# Patient Record
(demographics unavailable — no encounter records)

---

## 2025-05-11 NOTE — HISTORY OF PRESENT ILLNESS
[FreeTextEntry1] : Delmy is a well appearing almost two year old who was referred previously for a heart murmur and found to have a patent foramen ovale and bilateral peripheral pulmonic stenosis as well as a prominent eustachian valve versus a non-obstructive chiari network in the right atrium.   She presents today for a follow up assessment and remains asymptomatic. She is accompanied by her parent who provided information.   Delmy presents doing well.  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 Followed by ENT with [plans for BMT and adenoidectomy.   There has been no unexplained crying or irritability to suggest chest pain or palpitations.  There has been no cyanosis, shortness of breath, dizziness, lightheadedness, syncope or near syncope.  No reported history of feeding difficulties. BF on demand with formula supplementation.   No associated increased work of breathing, prolonged feeding duration, diaphoresis or early fatigue. Active and playful. No excessive fatigue.  Good appetite, remaining well hydrated. Normal urine output. No reported concerns with growth or development.  There has been no recent fevers, illnesses or hospitalizations. Eczema and uses flonase. No recent albuterol use.    Birth Hx Full-term; c/s secondary to nuchal cord, maternal fever. By parental report retained fetal fluid; CPAP with oxygen requirement till ~ 3 days old. No CLD or pulmonary hypertension.    No family history of an arrhythmia, aortic aneurysm, unexplained death, bicuspid aortic valve,  congenital heart disease, cardiomyopathy or sudden cardiac death, long QT syndrome, drowning or unexplained accidental death.

## 2025-05-11 NOTE — PAST MEDICAL HISTORY
[At Term] : at term [Birth Weight:___] : [unfilled] weighed [unfilled] at birth. [ Section] : by  section [Non-reassuring Fetal Status] : non-reassuring fetal status [None] : No maternal complications [de-identified] : Cord around neck [de-identified] : Fever and virus [FreeTextEntry1] : Fluid in lungs

## 2025-05-11 NOTE — CONSULT LETTER
[Today's Date] : [unfilled] [Name] : Name: [unfilled] [] : : ~~ [Today's Date:] : [unfilled] [Dear  ___:] : Dear Dr. [unfilled]: [Consult] : I had the pleasure of evaluating your patient, [unfilled]. My full evaluation follows. [Consult - Single Provider] : Thank you very much for allowing me to participate in the care of this patient. If you have any questions, please do not hesitate to contact me. [Sincerely,] : Sincerely, [FreeTextEntry4] : Roland Mccain MD [FreeTextEntry6] : [place] [address] [FreeTextEntry5] : [address]  [de-identified] : Alton Fernández DO MPH\par  Pediatric Cardiology\par  [facility]\par   .

## 2025-05-11 NOTE — CARDIOLOGY SUMMARY
[LVSF ___%] : LV Shortening Fraction [unfilled]% [de-identified] : 5/9/25 [FreeTextEntry1] : Normal sinus rhythm @ 104 bpm OK: 98 ms QRS: 64 ms  QTc: 402 ms P-R-T Axis (37-43-49) Normal voltage and intervals No ST segment abnormalities No pre-excitation  [de-identified] : 5/9/25 [FreeTextEntry2] : A complete 2D, M-mode, doppler and color flow doppler transthoracic pediatric echocardiogram was performed. The intracardiac anatomy and doppler flow profiles were otherwise normal appearing with the following:   Summary: 1. No evidence of pulmonary hypertension. 2. Prominent eustachian valve versus a non-obstructive chiari network in the right atrium. 3. Patent foramen ovale with left to right shunt, normal variant. 4. Trivial tricuspid valve regurgitation. 5. Normal right ventricular morphology with qualitatively normal size and systolic function. 6. Normal left ventricular size, morphology and systolic function. 7. No pericardial effusion.

## 2025-05-11 NOTE — REVIEW OF SYSTEMS
[Nasal Discharge] : nasal discharge [Nasal Stuffiness] : nasal congestion [Cough] : cough [Acting Fussy] : not acting ~L fussy [Fever] : no fever [Wgt Loss (___ Lbs)] : no recent weight loss [Pallor] : not pale [Eye Discharge] : no eye discharge [Redness] : no redness [Sore Throat] : no sore throat [Earache] : no earache [Cyanosis] : no cyanosis [Edema] : no edema [Diaphoresis] : not diaphoretic [Chest Pain] : no chest pain or discomfort [Exercise Intolerance] : no persistence of exercise intolerance [Fast HR] : no tachycardia [Tachypnea] : not tachypneic [Wheezing] : no wheezing [Being A Poor Eater] : not a poor eater [Vomiting] : no vomiting [Diarrhea] : no diarrhea [Decrease In Appetite] : appetite not decreased [Abdominal Pain] : no abdominal pain [Fainting (Syncope)] : no fainting [Seizure] : no seizures [Hypotonicity (Flaccid)] : not hypotonic [Limping] : no limping [Joint Pains] : no arthralgias [Joint Swelling] : no joint swelling [Rash] : no rash [Wound problems] : no wound problems [Bruising] : no tendency for easy bruising [Nosebleeds] : no epistaxis [Swollen Glands] : no lymphadenopathy [Sleep Disturbances] : ~T no sleep disturbances [Hyperactive] : no hyperactive behavior [Failure To Thrive] : no failure to thrive [Short Stature] : short stature was not noted [Dec Urine Output] : no oliguria [FreeTextEntry1] : eczema rash

## 2025-05-11 NOTE — DISCUSSION/SUMMARY
[May participate in all age-appropriate activities] : [unfilled] May participate in all age-appropriate activities. [FreeTextEntry1] : RAUL  is a healthy, thriving 23 month old who presents without identified concerns for congestive heart failure nor impaired cardiac output by her  past medical history nor on her  current physical exam. her  EKG and echocardiogram were further reassuring.   -Ongoing trivial tricuspid regurgitation in otherwise well functioning valves. This was not audible on physical exam and not hemodynamically significant at this time.   -There remains a patent foramen ovale with left to right flow. There remains likely a prominent eustachian valve versus a non-obstructive chiari network. Normal resting EKG.  These are persistent structures of fetal circulation specifically remnants of the right valve of sinus venosus; with some studies demonstrating incidental findings among 1-4% of the population. Often with a PFO. They tend to be benign without identified correlating clinical symptoms at this time.  -No ongoing significant PPS.   - I explained to her  parent that the murmur on exam is consistent with an innocent flow murmur and not likely related to any significant cardiac pathology. her  echocardiogram was further reassuring without identified murmur causing cardiac lesions. These murmurs may even be heard louder during times of fever or illness.   -Normal biventricular size and systolic function. Pulmonary artery pressure does not appear significantly elevated.   -No identified cardiac contraindications to her upcoming ENT surgery at this time.   Reviewed symptoms including feeding intolerance, cyanosis or changes in work of breathing that should prompt medical evaluation and sooner follow up.    I recommended 12 month follow up and we reviewed signs and symptoms that should prompt medical attention and sooner evaluation. Above information explained in detail with assistance of a colored diagram.  RAUL's family verbalized their understanding and all questions were answered.    [Needs SBE Prophylaxis] : [unfilled] does not need bacterial endocarditis prophylaxis

## 2025-05-11 NOTE — PHYSICAL EXAM
[General Appearance - Alert] : alert [General Appearance - In No Acute Distress] : in no acute distress [General Appearance - Well Nourished] : well nourished [General Appearance - Well Developed] : well developed [General Appearance - Well-Appearing] : well appearing [Appearance Of Head] : the head was normocephalic [Facies] : there were no dysmorphic facial features [Sclera] : the conjunctiva were normal [Outer Ear] : the ears and nose were normal in appearance [Examination Of The Oral Cavity] : mucous membranes were moist and pink [Auscultation Breath Sounds / Voice Sounds] : breath sounds clear to auscultation bilaterally [Normal Chest Appearance] : the chest was normal in appearance [Apical Impulse] : quiet precordium with normal apical impulse [Heart Rate And Rhythm] : normal heart rate and rhythm [Heart Sounds] : normal S1 and S2 [Heart Sounds Gallop] : no gallops [Heart Sounds Pericardial Friction Rub] : no pericardial rub [Heart Sounds Click] : no clicks [Arterial Pulses] : normal upper and lower extremity pulses with no pulse delay [Edema] : no edema [Capillary Refill Test] : normal capillary refill [Systolic] : systolic [II] : a grade 2/6 [LMSB] : LMSB  [LUSB] : LUSB [Musical] : musical [Bowel Sounds] : normal bowel sounds [Abdomen Soft] : soft [Nondistended] : nondistended [Abdomen Tenderness] : non-tender [Nail Clubbing] : no clubbing  or cyanosis of the fingers [Motor Tone] : normal muscle strength and tone [Cervical Lymph Nodes Enlarged Anterior] : The anterior cervical nodes were normal [Cervical Lymph Nodes Enlarged Posterior] : The posterior cervical nodes were normal [Skin Lesions] : no lesions [Skin Turgor] : normal turgor [] : increases when supine [FreeTextEntry1] : Femoral Pulse +2 B/L; Posterior tibial pulse +2 B/L

## 2025-05-11 NOTE — CONSULT LETTER
[Today's Date] : [unfilled] [Name] : Name: [unfilled] [] : : ~~ [Today's Date:] : [unfilled] [Dear  ___:] : Dear Dr. [unfilled]: [Consult] : I had the pleasure of evaluating your patient, [unfilled]. My full evaluation follows. [Consult - Single Provider] : Thank you very much for allowing me to participate in the care of this patient. If you have any questions, please do not hesitate to contact me. [Sincerely,] : Sincerely, [FreeTextEntry4] : Roland Mccain MD [FreeTextEntry5] : [address]  [FreeTextEntry6] : [place] [address] [de-identified] : Alton Fernández DO MPH\par  Pediatric Cardiology\par  [facility]\par

## 2025-05-11 NOTE — PAST MEDICAL HISTORY
[At Term] : at term [Birth Weight:___] : [unfilled] weighed [unfilled] at birth. [ Section] : by  section [Non-reassuring Fetal Status] : non-reassuring fetal status [None] : No maternal complications [de-identified] : Fever and virus [de-identified] : Cord around neck [FreeTextEntry1] : Fluid in lungs

## 2025-05-11 NOTE — CARDIOLOGY SUMMARY
[LVSF ___%] : LV Shortening Fraction [unfilled]% [de-identified] : 5/9/25 [FreeTextEntry1] : Normal sinus rhythm @ 104 bpm SC: 98 ms QRS: 64 ms  QTc: 402 ms P-R-T Axis (37-43-49) Normal voltage and intervals No ST segment abnormalities No pre-excitation  [de-identified] : 5/9/25 [FreeTextEntry2] : A complete 2D, M-mode, doppler and color flow doppler transthoracic pediatric echocardiogram was performed. The intracardiac anatomy and doppler flow profiles were otherwise normal appearing with the following:   Summary: 1. No evidence of pulmonary hypertension. 2. Prominent eustachian valve versus a non-obstructive chiari network in the right atrium. 3. Patent foramen ovale with left to right shunt, normal variant. 4. Trivial tricuspid valve regurgitation. 5. Normal right ventricular morphology with qualitatively normal size and systolic function. 6. Normal left ventricular size, morphology and systolic function. 7. No pericardial effusion.

## 2025-07-03 NOTE — REVIEW OF SYSTEMS
[Negative] : Heme/Lymph [de-identified] : As per HPI [de-identified] : As per HPI [de-identified] : As per HPI

## 2025-07-03 NOTE — CONSULT LETTER
[Dear  ___] : Dear  [unfilled], [Courtesy Letter:] : I had the pleasure of seeing your patient, [unfilled], in my office today. [Please see my note below.] : Please see my note below. [Consult Closing:] : Thank you very much for allowing me to participate in the care of this patient.  If you have any questions, please do not hesitate to contact me. [Sincerely,] : Sincerely, [FreeTextEntry2] : Dr. Keisha Davis [address] [place] [address] [phone] [FreeTextEntry3] : Gaby Crandall MD Pediatric Otolaryngology / Head and Neck Surgery    [facility] [address] [place] [address] Tel [phone] Fax [phone]    [address] [facility], [facility] [address] [place] [address]  Tel [phone] Fax [phone]

## 2025-07-03 NOTE — REASON FOR VISIT
[Post-Operative Visit] : a post-operative visit for [Ear Infections] : ear infections [Nasal Obstruction] : nasal obstruction [Sleep Apnea/ Snoring] : sleep apnea/ snoring [Mother] : mother

## 2025-07-03 NOTE — HISTORY OF PRESENT ILLNESS
[de-identified] : Today I had the pleasure of seeing RAUL for post op evaluation.      RAUL is now s/p Bilateral myringotomy with tubes and adenoidectomy 5/19/2025 PREOP PSG from 1/30/25 showing mild SHAINA. Sleep efficiency 80.7%, AHI 1.9/hr, REM AHI 5.1/hr, elkin 94%. [de-identified] : sleep much better, snoring basically gone, rhinitis improved, hearing improved, no drainage or issues after surgery

## 2025-07-03 NOTE — ASSESSMENT
[FreeTextEntry1] : RAUL is a 2 year old girl now s/p adenoidectomy and bilateral myringotomy with tubes 5/19/25  PREOP  PSG from 1/30/25 showing mild SHAINA. Sleep efficiency 80.7%, AHI 1.9/hr, REM AHI 5.1/hr, elkin 94%  Eustachian Tube Dysfunction  - audiogram reviewed as above within normal limits 7/3/25 - expectant management, monitor PET q6months until extrusion   Sleep disordered breathing- mild SHAINA, AHI 1.9/hr - symptoms resolved